# Patient Record
Sex: MALE | Race: WHITE | NOT HISPANIC OR LATINO
[De-identification: names, ages, dates, MRNs, and addresses within clinical notes are randomized per-mention and may not be internally consistent; named-entity substitution may affect disease eponyms.]

---

## 2021-08-11 ENCOUNTER — APPOINTMENT (OUTPATIENT)
Dept: SURGERY | Facility: CLINIC | Age: 76
End: 2021-08-11

## 2021-08-18 ENCOUNTER — APPOINTMENT (OUTPATIENT)
Dept: SURGERY | Facility: CLINIC | Age: 76
End: 2021-08-18

## 2021-08-20 ENCOUNTER — TRANSCRIPTION ENCOUNTER (OUTPATIENT)
Age: 76
End: 2021-08-20

## 2021-08-25 ENCOUNTER — APPOINTMENT (OUTPATIENT)
Dept: SURGERY | Facility: CLINIC | Age: 76
End: 2021-08-25

## 2021-09-02 ENCOUNTER — APPOINTMENT (OUTPATIENT)
Dept: SURGERY | Facility: CLINIC | Age: 76
End: 2021-09-02

## 2021-09-09 ENCOUNTER — APPOINTMENT (OUTPATIENT)
Dept: SURGERY | Facility: CLINIC | Age: 76
End: 2021-09-09

## 2021-09-30 ENCOUNTER — APPOINTMENT (OUTPATIENT)
Dept: SURGERY | Facility: CLINIC | Age: 76
End: 2021-09-30

## 2021-10-07 ENCOUNTER — APPOINTMENT (OUTPATIENT)
Dept: SURGERY | Facility: CLINIC | Age: 76
End: 2021-10-07

## 2021-10-14 ENCOUNTER — APPOINTMENT (OUTPATIENT)
Dept: SURGERY | Facility: CLINIC | Age: 76
End: 2021-10-14

## 2021-10-21 ENCOUNTER — APPOINTMENT (OUTPATIENT)
Dept: SURGERY | Facility: CLINIC | Age: 76
End: 2021-10-21

## 2021-10-28 ENCOUNTER — APPOINTMENT (OUTPATIENT)
Dept: SURGERY | Facility: CLINIC | Age: 76
End: 2021-10-28

## 2021-11-04 ENCOUNTER — APPOINTMENT (OUTPATIENT)
Dept: SURGERY | Facility: CLINIC | Age: 76
End: 2021-11-04

## 2022-03-29 PROBLEM — Z00.00 ENCOUNTER FOR PREVENTIVE HEALTH EXAMINATION: Status: ACTIVE | Noted: 2022-03-29

## 2022-03-31 ENCOUNTER — APPOINTMENT (OUTPATIENT)
Dept: NEPHROLOGY | Facility: CLINIC | Age: 77
End: 2022-03-31
Payer: COMMERCIAL

## 2022-03-31 VITALS
SYSTOLIC BLOOD PRESSURE: 144 MMHG | WEIGHT: 217 LBS | OXYGEN SATURATION: 98 % | DIASTOLIC BLOOD PRESSURE: 78 MMHG | HEART RATE: 78 BPM | RESPIRATION RATE: 18 BRPM | TEMPERATURE: 97.6 F | HEIGHT: 71 IN | BODY MASS INDEX: 30.38 KG/M2

## 2022-03-31 DIAGNOSIS — D64.9 ANEMIA, UNSPECIFIED: ICD-10-CM

## 2022-03-31 DIAGNOSIS — M19.90 UNSPECIFIED OSTEOARTHRITIS, UNSPECIFIED SITE: ICD-10-CM

## 2022-03-31 DIAGNOSIS — K22.70 BARRETT'S ESOPHAGUS W/OUT DYSPLASIA: ICD-10-CM

## 2022-03-31 DIAGNOSIS — Z82.49 FAMILY HISTORY OF ISCHEMIC HEART DISEASE AND OTHER DISEASES OF THE CIRCULATORY SYSTEM: ICD-10-CM

## 2022-03-31 DIAGNOSIS — K51.90 ULCERATIVE COLITIS, UNSPECIFIED, W/OUT COMPLICATIONS: ICD-10-CM

## 2022-03-31 DIAGNOSIS — I71.2 THORACIC AORTIC ANEURYSM, W/OUT RUPTURE: ICD-10-CM

## 2022-03-31 DIAGNOSIS — M10.9 GOUT, UNSPECIFIED: ICD-10-CM

## 2022-03-31 DIAGNOSIS — Z81.2 FAMILY HISTORY OF TOBACCO ABUSE AND DEPENDENCE: ICD-10-CM

## 2022-03-31 DIAGNOSIS — Z82.69 FAMILY HISTORY OF OTHER DISEASES OF THE MUSCULOSKELETAL SYSTEM AND CONNECTIVE TISSUE: ICD-10-CM

## 2022-03-31 DIAGNOSIS — Z87.891 PERSONAL HISTORY OF NICOTINE DEPENDENCE: ICD-10-CM

## 2022-03-31 DIAGNOSIS — K21.9 GASTRO-ESOPHAGEAL REFLUX DISEASE W/OUT ESOPHAGITIS: ICD-10-CM

## 2022-03-31 DIAGNOSIS — E78.5 HYPERLIPIDEMIA, UNSPECIFIED: ICD-10-CM

## 2022-03-31 DIAGNOSIS — I77.9 DISORDER OF ARTERIES AND ARTERIOLES, UNSPECIFIED: ICD-10-CM

## 2022-03-31 PROCEDURE — 99204 OFFICE O/P NEW MOD 45 MIN: CPT

## 2022-03-31 RX ORDER — MULTIVITAMIN
TABLET ORAL DAILY
Refills: 0 | Status: ACTIVE | COMMUNITY

## 2022-03-31 RX ORDER — MELATONIN 3 MG
25 MCG TABLET ORAL
Refills: 0 | Status: ACTIVE | COMMUNITY

## 2022-03-31 RX ORDER — CHLORTHALIDONE 25 MG/1
25 TABLET ORAL DAILY
Refills: 0 | Status: ACTIVE | COMMUNITY

## 2022-03-31 RX ORDER — HYDROXYCHLOROQUINE SULFATE 200 MG/1
200 TABLET, FILM COATED ORAL
Refills: 0 | Status: ACTIVE | COMMUNITY

## 2022-03-31 RX ORDER — FLUTICASONE PROPIONATE 50 MCG
50 SPRAY, SUSPENSION NASAL
Refills: 0 | Status: ACTIVE | COMMUNITY

## 2022-03-31 RX ORDER — MESALAMINE 1.2 G/1
1.2 TABLET, DELAYED RELEASE ORAL DAILY
Refills: 0 | Status: ACTIVE | COMMUNITY

## 2022-03-31 RX ORDER — METOPROLOL SUCCINATE 50 MG/1
50 TABLET, EXTENDED RELEASE ORAL DAILY
Refills: 0 | Status: ACTIVE | COMMUNITY

## 2022-03-31 RX ORDER — ALLOPURINOL 100 MG/1
100 TABLET ORAL
Refills: 0 | Status: ACTIVE | COMMUNITY

## 2022-03-31 RX ORDER — SILDENAFIL 100 MG/1
100 TABLET, FILM COATED ORAL
Refills: 0 | Status: ACTIVE | COMMUNITY

## 2022-03-31 RX ORDER — GLUCOSAMINE/CHONDR SU A SOD 750-600 MG
750-600 TABLET ORAL
Refills: 0 | Status: ACTIVE | COMMUNITY

## 2022-03-31 NOTE — CONSULT LETTER
[Dear  ___] : Dear  [unfilled], [Consult Letter:] : I had the pleasure of evaluating your patient, [unfilled]. [Please see my note below.] : Please see my note below. [Consult Closing:] : Thank you very much for allowing me to participate in the care of this patient.  If you have any questions, please do not hesitate to contact me. [Sincerely,] : Sincerely, [FreeTextEntry3] : Deangelo [DrJesse  ___] : Dr. CAMARA [DrJesse ___] : Dr. CAMARA

## 2022-03-31 NOTE — REVIEW OF SYSTEMS
[As Noted in HPI] : as noted in HPI [Easy Bruising] : a tendency for easy bruising [Negative] : Endocrine [FreeTextEntry8] : nocturia x 1-2, most of the time it is twice, good urinary stream [de-identified] : some balancing issues

## 2022-03-31 NOTE — HISTORY OF PRESENT ILLNESS
[FreeTextEntry1] : Rei Lua is a 76-year old gentleman with a past medical history significant for hypertension, hyperlipidemia, ulcerative colitis on no prior immunosuppressive and without any surgeries, gout, a stable thoracic aortic aneurysm, and bilateral carotid disease in addition to the history outlined below who was followed by Dr. Barker for his stage III chronic kidney disease up until this past summer when Dr. Solano left the area.  He has been doing well and is without any recent illnesses.  He had an episode of diverticulitis in May 2022 though this has not recurred.  He takes no oral NSAIDS. He uses Aspercreme on rare occasions.  His full ROS is below.

## 2022-03-31 NOTE — PHYSICAL EXAM
[General Appearance - Alert] : alert [General Appearance - In No Acute Distress] : in no acute distress [Sclera] : the sclera and conjunctiva were normal [Extraocular Movements] : extraocular movements were intact [Neck Appearance] : the appearance of the neck was normal [] : no respiratory distress [Respiration, Rhythm And Depth] : normal respiratory rhythm and effort [Exaggerated Use Of Accessory Muscles For Inspiration] : no accessory muscle use [Auscultation Breath Sounds / Voice Sounds] : lungs were clear to auscultation bilaterally [Heart Rate And Rhythm] : heart rate was normal and rhythm regular [Heart Sounds] : normal S1 and S2 [Heart Sounds Gallop] : no gallops [Murmurs] : no murmurs [Heart Sounds Pericardial Friction Rub] : no pericardial rub [Edema] : there was no peripheral edema [Bowel Sounds] : normal bowel sounds [Abdomen Soft] : soft [No CVA Tenderness] : no ~M costovertebral angle tenderness [No Spinal Tenderness] : no spinal tenderness [Abnormal Walk] : normal gait [Nail Clubbing] : no clubbing  or cyanosis of the fingernails [Skin Color & Pigmentation] : normal skin color and pigmentation [Involuntary Movements] : no involuntary movements were seen [Skin Turgor] : normal skin turgor [No Focal Deficits] : no focal deficits [Oriented To Time, Place, And Person] : oriented to person, place, and time [Impaired Insight] : insight and judgment were intact [Affect] : the affect was normal [Mood] : the mood was normal

## 2022-03-31 NOTE — ASSESSMENT
[FreeTextEntry1] : Rei Lua is a 76-year old gentleman with a past medical history significant for hypertension, hyperlipidemia, ulcerative colitis on no prior immunosuppressive and without any surgeries, gout, a stable thoracic aortic aneurysm, and bilateral carotid disease in addition to stage III CKD.\par \par Mr. Lua's BUN/creatinine levels have trended as follows:  48/1.6 (1027/2021), 37/1.7 (08/17/2021). \par \par The urine microalbumin to creatinine ratio has trended as follows:  140.4 mcg/mg (01/27/2021), 430.9 mcg/mg (08/18/2021).  The urinalysis on this last date contained 12 WBC per hpf and  2 RBC per hpf.\par \par IMPRESSION:\par \par (1) Stage III CKD.  There is less than 1/2 gram of microalbumin per gram of creatinine (and likely less than 1 gram of proteinuria per gram of creaitnine).  The CKD is stable and likely related to atherosclerosis or hypertensive renal disease.  The chlorthalidone and trandolapril are likely contributors to the elevated serum creatinine. \par \par (2) Proteinuria.  This has increased over the past year.\par \par (3) Hypertension.  Mildly elevated during the first visit to my office.\par \par (4) Gout.  No recent exacerbations\par \par (5) Anemia\par \par RECOMMENDATIONS:\par \par (1) Repeat a set of renal function tests and urine protein studies (see below)\par (2) I made no changes to the medication regimen\par (3) I will obtain the prior renal ultrasound from [facility].   If one has not been done, will obtain one.

## 2022-08-01 ENCOUNTER — APPOINTMENT (OUTPATIENT)
Dept: NEPHROLOGY | Facility: CLINIC | Age: 77
End: 2022-08-01

## 2022-09-29 ENCOUNTER — APPOINTMENT (OUTPATIENT)
Dept: NEPHROLOGY | Facility: CLINIC | Age: 77
End: 2022-09-29

## 2022-09-29 VITALS
DIASTOLIC BLOOD PRESSURE: 82 MMHG | BODY MASS INDEX: 30.8 KG/M2 | TEMPERATURE: 97.8 F | SYSTOLIC BLOOD PRESSURE: 146 MMHG | RESPIRATION RATE: 18 BRPM | WEIGHT: 220 LBS | HEIGHT: 71 IN | HEART RATE: 64 BPM | OXYGEN SATURATION: 95 %

## 2022-09-29 DIAGNOSIS — Z87.19 PERSONAL HISTORY OF OTHER DISEASES OF THE DIGESTIVE SYSTEM: ICD-10-CM

## 2022-09-29 PROCEDURE — 99214 OFFICE O/P EST MOD 30 MIN: CPT

## 2022-09-29 RX ORDER — ASPIRIN 81 MG
81 TABLET, DELAYED RELEASE (ENTERIC COATED) ORAL DAILY
Refills: 0 | Status: DISCONTINUED | COMMUNITY
End: 2022-09-29

## 2022-09-29 RX ORDER — METHYLPREDNISOLONE 4 MG/1
TABLET ORAL
Refills: 0 | Status: ACTIVE | COMMUNITY

## 2022-09-29 NOTE — ASSESSMENT
[FreeTextEntry1] : Rei Lua is a 76-year old gentleman with a past medical history significant for hypertension, hyperlipidemia, ulcerative colitis on no prior immunosuppressive and without any surgeries, gout, a stable thoracic aortic aneurysm, and bilateral carotid disease in addition to stage III CKD.\par \par Mr. Lua's BUN/creatinine levels have trended as follows:  48/1.6 (1027/2021), 37/1.7 (08/17/2021, \par \par The urine microalbumin to creatinine ratio has trended as follows:  140.4 mcg/mg (01/27/2021), 430.9 mcg/mg (08/18/2021), 486.9 mcg/mg (04/13/2022).   Urine protein/creatinine ratio 0.74 grams per gram.\par \par IMPRESSION:\par \par (1) Stage III CKD.  There is less than 1 gram of proteinuria per gram of creatinine.  The CKD was stable in the past.  The CKD is  likely related to atherosclerosis or hypertensive renal disease.  The chlorthalidone and trandolapril are likely contributors to the elevated serum creatinine. \par \par (2) Proteinuria.  \par \par (3) Hypertension.  Mildly elevated during the first visit to my office.  The blood pressure at home has been "a little bit high lately" around 140/70-80. \par \par (4) Gout.  No recent exacerbations\par \par (5) Anemia\par \par RECOMMENDATIONS:\par \par (1) Restart a regular exercise program\par (2) Get salt out of the diet\par (3) Stay well hydrated\par (4) Mr. Lua will follow his blood pressures at home and forward them to me in 1 month.\par (5) I ordered a full set of repeat lab studies (see below)\par (6) We will call for the prior renal ultrasound. \par

## 2022-09-29 NOTE — PHYSICAL EXAM
[General Appearance - Alert] : alert [General Appearance - In No Acute Distress] : in no acute distress [Sclera] : the sclera and conjunctiva were normal [Extraocular Movements] : extraocular movements were intact [Neck Appearance] : the appearance of the neck was normal [] : no respiratory distress [Respiration, Rhythm And Depth] : normal respiratory rhythm and effort [Exaggerated Use Of Accessory Muscles For Inspiration] : no accessory muscle use [Auscultation Breath Sounds / Voice Sounds] : lungs were clear to auscultation bilaterally [Heart Rate And Rhythm] : heart rate was normal and rhythm regular [Heart Sounds] : normal S1 and S2 [Heart Sounds Gallop] : no gallops [Murmurs] : no murmurs [Heart Sounds Pericardial Friction Rub] : no pericardial rub [FreeTextEntry1] : Bilateral LE small pitting edema [Bowel Sounds] : normal bowel sounds [Abdomen Soft] : soft [Abnormal Walk] : normal gait [Involuntary Movements] : no involuntary movements were seen [Skin Color & Pigmentation] : normal skin color and pigmentation [Skin Turgor] : normal skin turgor [No Focal Deficits] : no focal deficits [Oriented To Time, Place, And Person] : oriented to person, place, and time [Impaired Insight] : insight and judgment were intact [Affect] : the affect was normal [Mood] : the mood was normal

## 2022-09-29 NOTE — HISTORY OF PRESENT ILLNESS
[FreeTextEntry1] : Rei Lua is a 76-year old gentleman with a past medical history significant for hypertension, hyperlipidemia, ulcerative colitis on no prior immunosuppressive and without any surgeries, gout, a stable thoracic aortic aneurysm, and bilateral carotid disease in addition to the history outlined below who was followed by Dr. Mj Solano for his stage III chronic kidney disease up until Dr. Solano left the area.    He takes no oral NSAIDS. He uses Aspercreme on rare occasions. \par \par Mr. Lua is here for follow up.  He has some balance issue and has been undergoing physical therapy.  He is unsure if this has been helpful. Overall, he has been doing well.

## 2023-01-24 ENCOUNTER — APPOINTMENT (OUTPATIENT)
Dept: SURGERY | Facility: CLINIC | Age: 78
End: 2023-01-24

## 2023-01-31 ENCOUNTER — APPOINTMENT (OUTPATIENT)
Dept: SURGERY | Facility: CLINIC | Age: 78
End: 2023-01-31

## 2023-02-02 ENCOUNTER — APPOINTMENT (OUTPATIENT)
Dept: NEPHROLOGY | Facility: CLINIC | Age: 78
End: 2023-02-02
Payer: COMMERCIAL

## 2023-02-02 ENCOUNTER — APPOINTMENT (OUTPATIENT)
Dept: NEPHROLOGY | Facility: CLINIC | Age: 78
End: 2023-02-02

## 2023-02-02 VITALS
HEART RATE: 62 BPM | SYSTOLIC BLOOD PRESSURE: 142 MMHG | DIASTOLIC BLOOD PRESSURE: 80 MMHG | OXYGEN SATURATION: 97 % | TEMPERATURE: 97.7 F | BODY MASS INDEX: 30.52 KG/M2 | RESPIRATION RATE: 18 BRPM | HEIGHT: 71 IN | WEIGHT: 218 LBS

## 2023-02-02 PROCEDURE — 99214 OFFICE O/P EST MOD 30 MIN: CPT

## 2023-02-02 RX ORDER — ROSUVASTATIN CALCIUM 10 MG/1
10 TABLET, FILM COATED ORAL DAILY
Refills: 0 | Status: DISCONTINUED | COMMUNITY
End: 2023-02-02

## 2023-02-02 RX ORDER — GABAPENTIN 300 MG/1
300 CAPSULE ORAL 3 TIMES DAILY
Refills: 0 | Status: ACTIVE | COMMUNITY

## 2023-02-02 NOTE — PHYSICAL EXAM
[General Appearance - Alert] : alert [General Appearance - In No Acute Distress] : in no acute distress [Extraocular Movements] : extraocular movements were intact [Sclera] : the sclera and conjunctiva were normal [Neck Appearance] : the appearance of the neck was normal [] : no respiratory distress [Respiration, Rhythm And Depth] : normal respiratory rhythm and effort [Exaggerated Use Of Accessory Muscles For Inspiration] : no accessory muscle use [Heart Rate And Rhythm] : heart rate was normal and rhythm regular [Auscultation Breath Sounds / Voice Sounds] : lungs were clear to auscultation bilaterally [Heart Sounds] : normal S1 and S2 [Heart Sounds Gallop] : no gallops [Murmurs] : no murmurs [Heart Sounds Pericardial Friction Rub] : no pericardial rub [FreeTextEntry1] : Bilateral LE small pitting edema [Bowel Sounds] : normal bowel sounds [Abdomen Soft] : soft [Abnormal Walk] : normal gait [Involuntary Movements] : no involuntary movements were seen [Skin Color & Pigmentation] : normal skin color and pigmentation [Skin Turgor] : normal skin turgor [No Focal Deficits] : no focal deficits [Oriented To Time, Place, And Person] : oriented to person, place, and time [Impaired Insight] : insight and judgment were intact [Affect] : the affect was normal [Mood] : the mood was normal

## 2023-02-02 NOTE — HISTORY OF PRESENT ILLNESS
[FreeTextEntry1] : Rei Lua is a 77-year old gentleman with a past medical history significant for hypertension, hyperlipidemia, ulcerative colitis on no prior immunosuppressive and without any surgeries, gout, a stable thoracic aortic aneurysm, and bilateral carotid disease in addition to the history outlined below who was followed by Dr. Mj Solano for his stage III chronic kidney disease up until Dr. Solano left the area.   He takes no oral NSAIDS. He uses Aspercreme on rare occasions. \par \par Mr. Lua is here for follow up.  He reports having an enzyme in his blood which has something to do with his muscles.  He was told to stop taking his statin.  He denies having any pain other than issues with "a deformed foot". He continues to undergo balance therapy, commenting "it seems to work".

## 2023-02-02 NOTE — ASSESSMENT
[FreeTextEntry1] : Rei Lua is a 77-year old gentleman with a past medical history significant for hypertension, hyperlipidemia, ulcerative colitis on no prior immunosuppressive and without any surgeries, gout, a stable thoracic aortic aneurysm, and bilateral carotid disease in addition to stage III CKD.\par \par Mr. Lua's BUN/creatinine levels have trended as follows:  48/1.6 (1027/2021), 37/1.7 (08/17/2021), 44/1.7 (12/09/2022). The urine protein to creatinine is 882 mg per gram.  The urinalysis on this late date demonstrated 5 WBC per high power field and 100 mg/dL or albumin.   \par \par The urine microalbumin to creatinine ratio has trended as follows:  140.4 mcg/mg (01/27/2021), 430.9 mcg/mg (08/18/2021), 486.9 mcg/mg (04/13/2022).   Urine protein/creatinine ratio 0.88 grams per gram.\par \par RENAL ULTRASOUND (11/30/2021) R. 10.9 cm L. 11.1 cm. Stable simple cyst measuring 1.3 x 1.3 cm in midpole of left kidney.\par \par IMPRESSION:\par \par (1) Stage III CKD.  There is less than 1 gram of proteinuria per gram of creatinine.  The CKD remains stable.  The CKD is  likely related to atherosclerosis or hypertensive renal disease.  The chlorthalidone and trandolapril are likely contributors to the elevated serum creatinine. \par \par (2)  Hypertension.  Mildly elevated today.  Mr. Lua reports his recent blood pressure was 120/70 at his neurologist's office.  \par \par (4) Gout.  Taking allopurinol. No recent exacerbations\par \par (5) Anemia  The HbA1c is 11.2 g/dL.  The MCV is elevated.\par \par (6) Elevated AST and ALT.  \par \par RECOMMENDATIONS:\par \par (1) Dr. Davis, ideally I would consider increasing the trandolapril by a small amount in order to control the proteinuria.  You would need to follow Mr. Lau's serum creatinine and blood pressures closely. \par (2)  We talked about ways to protect the kidneys:\par  -Good blood pressure control\par -Avoid the use of NSAIDS (ibuprofen, Motrin, Aleve,Celebrex, etc.).  Okay to use Tylenol.\par -Avoid salt 'like the plague'.  Limit sodium intake.  Do not add salt to your food.\par -Avoid animal high protein diets.\par -Stay well hydrated.\par -Good cholesterol control.\par (3) Mr. Lua should follow up with me in 1 year with the following lab results: CMP, CBC, phosphorous, urinalysis, random urine albumin, total protein, and creatinine. \par \par We talked about stage III CKD, proteinuria, and ways to protect the renal function. \par \par

## 2023-02-02 NOTE — REVIEW OF SYSTEMS
[As Noted in HPI] : as noted in HPI [Negative] : Heme/Lymph [FreeTextEntry8] : nocturia x 1-3,  good urinary stream (toe pain wakes him up) [de-identified] : itching on left side of face and inside of left knee has resolved [de-identified] : some balancing issues, numbness in feet (affects balance)- saw neurologist, treated with gabapentin

## 2023-03-07 ENCOUNTER — TRANSCRIPTION ENCOUNTER (OUTPATIENT)
Age: 78
End: 2023-03-07

## 2023-03-28 ENCOUNTER — APPOINTMENT (OUTPATIENT)
Dept: NEPHROLOGY | Facility: CLINIC | Age: 78
End: 2023-03-28

## 2023-04-04 ENCOUNTER — TRANSCRIPTION ENCOUNTER (OUTPATIENT)
Age: 78
End: 2023-04-04

## 2023-05-03 ENCOUNTER — NON-APPOINTMENT (OUTPATIENT)
Age: 78
End: 2023-05-03

## 2023-07-06 ENCOUNTER — TRANSCRIPTION ENCOUNTER (OUTPATIENT)
Age: 78
End: 2023-07-06

## 2024-02-05 ENCOUNTER — APPOINTMENT (OUTPATIENT)
Dept: NEPHROLOGY | Facility: CLINIC | Age: 79
End: 2024-02-05
Payer: MEDICARE

## 2024-02-05 VITALS
HEIGHT: 71 IN | OXYGEN SATURATION: 96 % | TEMPERATURE: 97.3 F | RESPIRATION RATE: 16 BRPM | WEIGHT: 214.5 LBS | SYSTOLIC BLOOD PRESSURE: 142 MMHG | HEART RATE: 72 BPM | DIASTOLIC BLOOD PRESSURE: 78 MMHG | BODY MASS INDEX: 30.03 KG/M2

## 2024-02-05 DIAGNOSIS — R79.89 OTHER SPECIFIED ABNORMAL FINDINGS OF BLOOD CHEMISTRY: ICD-10-CM

## 2024-02-05 PROCEDURE — 99214 OFFICE O/P EST MOD 30 MIN: CPT

## 2024-02-05 RX ORDER — EZETIMIBE 10 MG/1
10 TABLET ORAL DAILY
Refills: 0 | Status: ACTIVE | COMMUNITY

## 2024-02-05 RX ORDER — OMEPRAZOLE 20 MG/1
20 CAPSULE, DELAYED RELEASE ORAL DAILY
Refills: 0 | Status: ACTIVE | COMMUNITY

## 2024-02-05 RX ORDER — VERAPAMIL HYDROCHLORIDE 180 MG/1
180 TABLET ORAL DAILY
Refills: 0 | Status: ACTIVE | COMMUNITY

## 2024-02-05 RX ORDER — PANTOPRAZOLE 40 MG/1
40 TABLET, DELAYED RELEASE ORAL DAILY
Refills: 0 | Status: DISCONTINUED | COMMUNITY
End: 2024-02-05

## 2024-02-05 RX ORDER — TRANDOLAPRIL 1 MG/1
1 TABLET ORAL
Refills: 0 | Status: DISCONTINUED | COMMUNITY
End: 2024-02-05

## 2024-02-05 RX ORDER — HYDRALAZINE HYDROCHLORIDE 25 MG/1
25 TABLET ORAL 3 TIMES DAILY
Refills: 0 | Status: ACTIVE | COMMUNITY

## 2024-02-05 RX ORDER — SPIRONOLACTONE 25 MG/1
25 TABLET ORAL DAILY
Qty: 90 | Refills: 1 | Status: ACTIVE | COMMUNITY

## 2024-02-05 NOTE — PHYSICAL EXAM
[General Appearance - Alert] : alert [General Appearance - In No Acute Distress] : in no acute distress [Sclera] : the sclera and conjunctiva were normal [Extraocular Movements] : extraocular movements were intact [Neck Appearance] : the appearance of the neck was normal [] : no respiratory distress [Respiration, Rhythm And Depth] : normal respiratory rhythm and effort [Exaggerated Use Of Accessory Muscles For Inspiration] : no accessory muscle use [Auscultation Breath Sounds / Voice Sounds] : lungs were clear to auscultation bilaterally [Heart Rate And Rhythm] : heart rate was normal and rhythm regular [Heart Sounds] : normal S1 and S2 [Heart Sounds Gallop] : no gallops [Murmurs] : no murmurs [Heart Sounds Pericardial Friction Rub] : no pericardial rub [Edema] : there was no peripheral edema [Bowel Sounds] : normal bowel sounds [Abdomen Soft] : soft [Abnormal Walk] : normal gait [Involuntary Movements] : no involuntary movements were seen [Skin Color & Pigmentation] : normal skin color and pigmentation [Skin Turgor] : normal skin turgor [No Focal Deficits] : no focal deficits [Oriented To Time, Place, And Person] : oriented to person, place, and time [Impaired Insight] : insight and judgment were intact [Affect] : the affect was normal [Mood] : the mood was normal

## 2024-02-05 NOTE — REVIEW OF SYSTEMS
[As Noted in HPI] : as noted in HPI [Easy Bruising] : a tendency for easy bruising [Negative] : Heme/Lymph [FreeTextEntry8] : nocturia x 1, good urinary stream [de-identified] : some balancing issues, numbness in feet (affects balance)- saw neurologist, treated with gabapentin

## 2024-02-05 NOTE — ASSESSMENT
[FreeTextEntry1] : Rei Lua is a 78-year old gentleman with a past medical history significant for hypertension, hyperlipidemia, ulcerative colitis on no prior immunosuppressive and without any surgeries, gout, a stable thoracic aortic aneurysm, and bilateral carotid disease in addition to stage III CKD.  Mr. Lua's BUN/creatinine levels have trended as follows: 48/1.6 (1027/2021), 37/1.7 (08/17/2021), 44/1.7 (12/09/2022) --> 55/2.14 (01/04/2024). The urine protein to creatinine increased from 0.882 g/g to 1.83 g/g over the past year.  The UACR is 1280 mg per gram (01/04/2024).  Remember, this value trended as follows:  The urine microalbumin to creatinine ratio has trended as follows: 140.4 mcg/mg (01/27/2021), 430.9 mcg/mg (08/18/2021), 486.9 mcg/mg (04/13/2022).   RENAL ULTRASOUND (11/30/2021) R. 10.9 cm L. 11.1 cm. Stable simple cyst measuring 1.3 x 1.3 cm in midpole of left kidney.  RECENT LAB STUDIES (01/04/2024):  (6) Anti-histone antibody (+).  Anti DS DNA antibody (-), C3 complement 11 mg/dL, C4 complement 28 mg/dL  IMPRESSION:  (1) Stage III CKD. The proteinuria has progress from 0.8 g/g to 1.8 g/g.  The serum creatinine has increased over the past year.  Of note, Mr. Lua has been taking Chlorthalidone for well over a year, though trandolapril (which can increase serum creatinine) was stopped around May 2023 due to tongue swelling and dyspnea. Spironolactone was added to the medication regimen in the fall.  This can explain the increase in the serum creatinine but does not explain the increase in proteinuria. In the past I commented that the stable CKD was likely related to atherosclerosis or hypertensive renal disease.  Mr. Lua takes no NSAIDS.   (2) Hypertension. Mildly elevated today.   (4) Gout. Taking allopurinol. No recent exacerbations  (5) Anemia The HbA1c is 11.2 g/dL. The MCV is elevated.   RECOMMENDATIONS:  (1) Stay well hydrated. (2) Renal ultrasound. (3) Repeat urine protein studies and a full nephrotic and nephritic workup. (4) Avoid the use of all NSAIDS. (5) Rule out underlying malignancy.  I will call Mr. Lua with the results and a plan.  I gave a copy of this note to Mr. Lua to give to his new Primary Care Physician.      We talked about stage III CKD, proteinuria, and ways to protect the renal function.

## 2024-02-05 NOTE — HISTORY OF PRESENT ILLNESS
[FreeTextEntry1] : Rei Lua is a 78-year-old gentleman with a past medical history significant for hypertension, hyperlipidemia, ulcerative colitis on no prior immunosuppressive and without any surgeries, gout, a stable thoracic aortic aneurysm (scheduled for echocardiogram), and bilateral carotid disease in addition to the history outlined below who was followed by Dr. Mj Solano for his stage III chronic kidney disease. He takes no oral NSAIDS.  He uses Aspercreme on rare occasions.   Mr. Lua is here for follow up.  Over the past year he had 2 toes amputated (one for osteomyelitis), inguinal hernia repair, and retina repair (01/02/204).  Following repair for the torn retina, he contracted COVID.  Currently, he feels "good".  He is going to physical therapy for balance issues related, in part, to his peripheral neuropathy.

## 2024-02-05 NOTE — CONSULT LETTER
[Consult Letter:] : I had the pleasure of evaluating your patient, [unfilled]. [Please see my note below.] : Please see my note below. [Consult Closing:] : Thank you very much for allowing me to participate in the care of this patient.  If you have any questions, please do not hesitate to contact me. [Sincerely,] : Sincerely, [Dear  ___] : Dear  [unfilled], [FreeTextEntry3] : Deangelo [DrJesse  ___] : Dr. CAMARA [DrJesse ___] : Dr. CAMARA

## 2024-02-06 ENCOUNTER — LABORATORY RESULT (OUTPATIENT)
Age: 79
End: 2024-02-06

## 2024-02-07 ENCOUNTER — RESULT REVIEW (OUTPATIENT)
Age: 79
End: 2024-02-07

## 2024-02-23 ENCOUNTER — LABORATORY RESULT (OUTPATIENT)
Age: 79
End: 2024-02-23

## 2024-03-01 LAB
ALBUMIN MFR SERPL ELPH: 55.7 %
ALBUMIN SERPL ELPH-MCNC: 4.2 G/DL
ALBUMIN SERPL-MCNC: 3.6 G/DL
ALBUMIN/GLOB SERPL: 1.2 RATIO
ALP BLD-CCNC: 95 U/L
ALPHA1 GLOB MFR SERPL ELPH: 5.1 %
ALPHA1 GLOB SERPL ELPH-MCNC: 0.3 G/DL
ALPHA2 GLOB MFR SERPL ELPH: 11.2 %
ALPHA2 GLOB SERPL ELPH-MCNC: 0.7 G/DL
ALT SERPL-CCNC: 45 U/L
ANA SER IF-ACNC: NEGATIVE
ANION GAP SERPL CALC-SCNC: 10 MMOL/L
APPEARANCE: CLEAR
ASO AB SER LA-ACNC: 174 IU/ML
AST SERPL-CCNC: 68 U/L
B-GLOBULIN MFR SERPL ELPH: 11.9 %
B-GLOBULIN SERPL ELPH-MCNC: 0.8 G/DL
BACTERIA: NEGATIVE /HPF
BILIRUB SERPL-MCNC: 0.2 MG/DL
BILIRUBIN URINE: NEGATIVE
BLOOD URINE: NEGATIVE
BUN SERPL-MCNC: 56 MG/DL
C3 SERPL-MCNC: 116 MG/DL
C4 SERPL-MCNC: 30 MG/DL
CALCIUM SERPL-MCNC: 8.4 MG/DL
CAST: 0 /LPF
CHLORIDE SERPL-SCNC: 103 MMOL/L
CO2 SERPL-SCNC: 21 MMOL/L
COLOR: YELLOW
CREAT SERPL-MCNC: 2 MG/DL
CREAT SPEC-SCNC: 88 MG/DL
CREAT/PROT UR: 0.7 RATIO
EGFR: 34 ML/MIN/1.73M2
EPITHELIAL CELLS: 0 /HPF
GAMMA GLOB FLD ELPH-MCNC: 1 G/DL
GAMMA GLOB MFR SERPL ELPH: 16.1 %
GLUCOSE QUALITATIVE U: NEGATIVE MG/DL
GLUCOSE SERPL-MCNC: 113 MG/DL
HBV CORE IGG+IGM SER QL: NONREACTIVE
HBV SURFACE AB SER QL: NONREACTIVE
HBV SURFACE AG SER QL: NONREACTIVE
HCV RNA FLD QL NAA+PROBE: NORMAL
HCV RNA SPEC QL PROBE+SIG AMP: NOT DETECTED
IGA 24H UR QL IFE: NORMAL
INTERPRETATION SERPL IEP-IMP: NORMAL
KETONES URINE: NEGATIVE MG/DL
LEUKOCYTE ESTERASE URINE: NEGATIVE
M PROTEIN MFR SERPL ELPH: NORMAL
MICROSCOPIC-UA: NORMAL
MONOCLON BAND OBS SERPL: NORMAL
NITRITE URINE: NEGATIVE
PH URINE: 5
PHOSPHATE SERPL-MCNC: 3.6 MG/DL
PHOSPHOLIPASE A2 RECEPTOR ELISA: <1.8 RU/ML
POTASSIUM SERPL-SCNC: 4.9 MMOL/L
PROT SERPL-MCNC: 6.5 G/DL
PROT UR-MCNC: 58 MG/DL
PROTEIN URINE: 100 MG/DL
RED BLOOD CELLS URINE: 1 /HPF
SODIUM SERPL-SCNC: 134 MMOL/L
SPECIFIC GRAVITY URINE: 1.02
T PALLIDUM AB SER QL IA: NEGATIVE
UROBILINOGEN URINE: 0.2 MG/DL
WHITE BLOOD CELLS URINE: 1 /HPF

## 2024-04-24 ENCOUNTER — TRANSCRIPTION ENCOUNTER (OUTPATIENT)
Age: 79
End: 2024-04-24

## 2024-05-21 ENCOUNTER — APPOINTMENT (OUTPATIENT)
Dept: NEPHROLOGY | Facility: CLINIC | Age: 79
End: 2024-05-21
Payer: MEDICARE

## 2024-05-21 VITALS
RESPIRATION RATE: 14 BRPM | HEART RATE: 60 BPM | HEIGHT: 71 IN | BODY MASS INDEX: 27.58 KG/M2 | WEIGHT: 197 LBS | DIASTOLIC BLOOD PRESSURE: 74 MMHG | SYSTOLIC BLOOD PRESSURE: 122 MMHG

## 2024-05-21 DIAGNOSIS — R80.9 PROTEINURIA, UNSPECIFIED: ICD-10-CM

## 2024-05-21 DIAGNOSIS — I10 ESSENTIAL (PRIMARY) HYPERTENSION: ICD-10-CM

## 2024-05-21 DIAGNOSIS — M60.9 MYOSITIS, UNSPECIFIED: ICD-10-CM

## 2024-05-21 DIAGNOSIS — N18.30 CHRONIC KIDNEY DISEASE, STAGE 3 UNSPECIFIED: ICD-10-CM

## 2024-05-21 DIAGNOSIS — Z87.39 PERSONAL HISTORY OF OTHER DISEASES OF THE MUSCULOSKELETAL SYSTEM AND CONNECTIVE TISSUE: ICD-10-CM

## 2024-05-21 DIAGNOSIS — D47.2 MONOCLONAL GAMMOPATHY: ICD-10-CM

## 2024-05-21 PROCEDURE — 99214 OFFICE O/P EST MOD 30 MIN: CPT

## 2024-05-21 NOTE — REVIEW OF SYSTEMS
[Feeling Tired] : feeling tired [As Noted in HPI] : as noted in HPI [Wheezing] : wheezing [Cough] : cough [Easy Bruising] : a tendency for easy bruising [Negative] : Heme/Lymph [FreeTextEntry7] : having dark stools (taking pepto-bismol).   [FreeTextEntry8] : nocturia x 3-4, good urinary stream [de-identified] : some balancing issues, numbness in feet (affects balance)- saw neurologist, treated with gabapentin

## 2024-05-21 NOTE — CONSULT LETTER
[Consult Letter:] : I had the pleasure of evaluating your patient, [unfilled]. [Please see my note below.] : Please see my note below. [Consult Closing:] : Thank you very much for allowing me to participate in the care of this patient.  If you have any questions, please do not hesitate to contact me. [Sincerely,] : Sincerely, [DrJesse  ___] : Dr. CAMARA [Dear  ___] : Dear  [unfilled], [FreeTextEntry3] : Deangelo [DrJesse ___] : Dr. CAMARA

## 2024-05-21 NOTE — ASSESSMENT
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  He is taking amoxicillin for pneumonia.   Overall, he is feeling much arnold  Mr. Lua's BUN/creatinine levels have trended as follows: 48/1.6 (1027/2021), 37/1.7 (08/17/2021), 44/1.7 (12/09/2022) --> 55/2.14 (01/04/2024), 56/2.0 (2/24/2024), 58/2.11 (04/23/2024), 68/2.30 (04/23/3034), 65/2.03 (04/24/2024). The urine protein to creatinine increased from 0.882 g/g to 1.83 g/g over the past year, though on repeat was 0.7 g/g (052/24/2024).  Remember, this value trended as follows: The urine microalbumin to creatinine ratio has trended as follows: 140.4 mcg/mg (01/27/2021), 430.9 mcg/mg (08/18/2021), 486.9 mcg/mg (04/13/2022).  RENAL ULTRASOUND (11/30/2021) R. 10.9 cm L. 11.1 cm. Stable simple cyst measuring 1.3 x 1.3 cm in midpole of left kidney.   LAB STUDIES (01/04/2024): (Anti-histone antibody (+). Anti DS DNA antibody (-), C3 complement 11 mg/dL, C4 complement 28 mg/dL  PROTEINURIA WORKUP: (-) except for weak gamma-migrating paraprotein (unable to quantitate) and a weak IgG kappa band detected on the serum immunofixation.   IMPRESSION:  (1) Stage III CKD. The proteinuria has progress from 0.8 g/g to 1.8 g/g. The serum creatinine has increased over the past year. Of note, Mr. Lua has been taking Chlorthalidone for well over a year, though trandolapril (which can increase serum creatinine) was stopped around May 2023 due to tongue swelling and dyspnea. Spironolactone was added to the medication regimen in the fall. This can explain the increase in the serum creatinine but does not explain the increase in proteinuria. In the past I commented that the stable CKD was likely related to atherosclerosis or hypertensive renal disease. I can not rule out a contribution from the myositis although this is less likely with CPK levels that generally run under 1000, and per my review were no higher than 2000. Mr. Lua takes no NSAIDS.  (2) Hypertension. Excellent control.  Lost weight and cut back on alcohol.   (4) Gout. Taking allopurinol. No recent exacerbations  (5) Anemia.  Recent GI bleed.   (6) (+) serum immunofixation. Weak IgG kappa band identified.  Can be seen in autoimmune diseases.    RECOMMENDATIONS:  (1) Stay well hydrated. (2) Continue to limit alcohol. (3) Avoid the use of all NSAIDS. (4) Rule out underlying malignancy. (5) Return in 6 months with the lab studies below.

## 2024-05-21 NOTE — PHYSICAL EXAM
[General Appearance - Alert] : alert [General Appearance - In No Acute Distress] : in no acute distress [Sclera] : the sclera and conjunctiva were normal [Extraocular Movements] : extraocular movements were intact [Neck Appearance] : the appearance of the neck was normal [] : no respiratory distress [Respiration, Rhythm And Depth] : normal respiratory rhythm and effort [Exaggerated Use Of Accessory Muscles For Inspiration] : no accessory muscle use [Auscultation Breath Sounds / Voice Sounds] : lungs were clear to auscultation bilaterally [Heart Rate And Rhythm] : heart rate was normal and rhythm regular [Heart Sounds] : normal S1 and S2 [Heart Sounds Gallop] : no gallops [Murmurs] : no murmurs [Heart Sounds Pericardial Friction Rub] : no pericardial rub [Edema] : there was no peripheral edema [Bowel Sounds] : normal bowel sounds [Abnormal Walk] : normal gait [Abdomen Soft] : soft [Involuntary Movements] : no involuntary movements were seen [Skin Color & Pigmentation] : normal skin color and pigmentation [Skin Turgor] : normal skin turgor [No Focal Deficits] : no focal deficits [Oriented To Time, Place, And Person] : oriented to person, place, and time [Impaired Insight] : insight and judgment were intact [Mood] : the mood was normal [Affect] : the affect was normal

## 2024-05-21 NOTE — HISTORY OF PRESENT ILLNESS
[FreeTextEntry1] : Rei Lua is a 78-year-old gentleman with a past medical history significant for hypertension, hyperlipidemia, ulcerative colitis on no prior immunosuppressive and without any surgeries, gout, a stable thoracic aortic aneurysm (scheduled for echocardiogram), and bilateral carotid disease in addition to the history outlined below who was followed by Dr. Mj Solano for his stage III chronic kidney disease. He takes no oral NSAIDS.  He uses Aspercreme on rare occasions.   Mr. Lua is here for follow up.  He had a GI bleed (did not require PRBC transfusion) and developed pneumonia (now treated with amoxicillin) since his visit with me on 02/05/2024.  In addition, he is being treated with steroids for myositis.  He lost 15 pounds.  New medications:  amoxicillin, methylprednisolone.

## 2024-07-24 DIAGNOSIS — M62.82 RHABDOMYOLYSIS: ICD-10-CM

## 2024-07-31 LAB
CK SERPL-CCNC: 394 U/L
CREAT SPEC-SCNC: 102 MG/DL
CREAT SPEC-SCNC: 102 MG/DL
CREAT/PROT UR: 0.8 RATIO
HCT VFR BLD CALC: 34.1 %
HGB BLD-MCNC: 10.6 G/DL
MCHC RBC-ENTMCNC: 31.1 GM/DL
MCHC RBC-ENTMCNC: 32.9 PG
MCV RBC AUTO: 105.9 FL
MICROALBUMIN 24H UR DL<=1MG/L-MCNC: 49.1 MG/DL
MICROALBUMIN/CREAT 24H UR-RTO: 482 MG/G
PLATELET # BLD AUTO: 209 K/UL
PROT UR-MCNC: 77 MG/DL
RBC # BLD: 3.22 M/UL
RBC # FLD: 16.9 %
WBC # FLD AUTO: 5.53 K/UL

## 2024-08-08 ENCOUNTER — NON-APPOINTMENT (OUTPATIENT)
Age: 79
End: 2024-08-08

## 2024-11-13 ENCOUNTER — LABORATORY RESULT (OUTPATIENT)
Age: 79
End: 2024-11-13

## 2024-11-18 ENCOUNTER — APPOINTMENT (OUTPATIENT)
Dept: NEPHROLOGY | Facility: CLINIC | Age: 79
End: 2024-11-18
Payer: MEDICARE

## 2024-11-18 VITALS
SYSTOLIC BLOOD PRESSURE: 132 MMHG | HEIGHT: 71 IN | BODY MASS INDEX: 29.54 KG/M2 | RESPIRATION RATE: 18 BRPM | HEART RATE: 75 BPM | TEMPERATURE: 97.8 F | WEIGHT: 211 LBS | DIASTOLIC BLOOD PRESSURE: 78 MMHG | OXYGEN SATURATION: 98 %

## 2024-11-18 DIAGNOSIS — N18.30 CHRONIC KIDNEY DISEASE, STAGE 3 UNSPECIFIED: ICD-10-CM

## 2024-11-18 DIAGNOSIS — R80.9 PROTEINURIA, UNSPECIFIED: ICD-10-CM

## 2024-11-18 DIAGNOSIS — I10 ESSENTIAL (PRIMARY) HYPERTENSION: ICD-10-CM

## 2024-11-18 PROCEDURE — 99214 OFFICE O/P EST MOD 30 MIN: CPT

## 2024-11-18 RX ORDER — APIXABAN 5 MG/1
5 TABLET, FILM COATED ORAL
Refills: 0 | Status: ACTIVE | COMMUNITY

## 2024-11-18 RX ORDER — HYDRALAZINE HYDROCHLORIDE 50 MG/1
50 TABLET ORAL 3 TIMES DAILY
Refills: 0 | Status: ACTIVE | COMMUNITY

## 2024-11-18 RX ORDER — MYCOPHENOLATE MOFETIL 500 MG/1
500 TABLET ORAL TWICE DAILY
Qty: 360 | Refills: 0 | Status: ACTIVE | COMMUNITY

## 2024-11-18 RX ORDER — PREDNISONE 5 MG/1
5 TABLET ORAL DAILY
Refills: 0 | Status: ACTIVE | COMMUNITY

## 2025-03-20 ENCOUNTER — RESULT REVIEW (OUTPATIENT)
Age: 80
End: 2025-03-20

## 2025-05-09 ENCOUNTER — NON-APPOINTMENT (OUTPATIENT)
Age: 80
End: 2025-05-09

## 2025-05-19 ENCOUNTER — NON-APPOINTMENT (OUTPATIENT)
Age: 80
End: 2025-05-19

## 2025-05-20 ENCOUNTER — APPOINTMENT (OUTPATIENT)
Dept: NEPHROLOGY | Facility: CLINIC | Age: 80
End: 2025-05-20

## 2025-07-01 ENCOUNTER — APPOINTMENT (OUTPATIENT)
Dept: NEPHROLOGY | Facility: CLINIC | Age: 80
End: 2025-07-01
Payer: MEDICARE

## 2025-07-01 VITALS
HEIGHT: 71 IN | WEIGHT: 209 LBS | OXYGEN SATURATION: 96 % | DIASTOLIC BLOOD PRESSURE: 66 MMHG | HEART RATE: 74 BPM | SYSTOLIC BLOOD PRESSURE: 122 MMHG | RESPIRATION RATE: 16 BRPM | BODY MASS INDEX: 29.26 KG/M2 | TEMPERATURE: 98 F

## 2025-07-01 PROBLEM — E87.5 HYPERKALEMIA: Status: ACTIVE | Noted: 2025-07-01

## 2025-07-01 PROCEDURE — 99215 OFFICE O/P EST HI 40 MIN: CPT

## 2025-08-06 ENCOUNTER — LABORATORY RESULT (OUTPATIENT)
Age: 80
End: 2025-08-06

## 2025-08-13 ENCOUNTER — LABORATORY RESULT (OUTPATIENT)
Age: 80
End: 2025-08-13

## 2025-08-15 LAB
BASOPHILS # BLD AUTO: 0.02 K/UL
BASOPHILS NFR BLD AUTO: 0.3 %
EOSINOPHIL # BLD AUTO: 0.09 K/UL
EOSINOPHIL NFR BLD AUTO: 1.5 %
FERRITIN SERPL-MCNC: 61 NG/ML
FOLATE SERPL-MCNC: >20 NG/ML
HCT VFR BLD CALC: 28.6 %
HGB BLD-MCNC: 8.7 G/DL
IMM GRANULOCYTES NFR BLD AUTO: 0.3 %
IRON SATN MFR SERPL: 39 %
IRON SERPL-MCNC: 107 UG/DL
LDH SERPL-CCNC: 346 U/L
LYMPHOCYTES # BLD AUTO: 0.42 K/UL
LYMPHOCYTES NFR BLD AUTO: 7.2 %
MAN DIFF?: NORMAL
MCHC RBC-ENTMCNC: 30 PG
MCHC RBC-ENTMCNC: 30.4 G/DL
MCV RBC AUTO: 98.6 FL
MONOCYTES # BLD AUTO: 0.83 K/UL
MONOCYTES NFR BLD AUTO: 14.2 %
NEUTROPHILS # BLD AUTO: 4.48 K/UL
NEUTROPHILS NFR BLD AUTO: 76.5 %
PLATELET # BLD AUTO: 229 K/UL
RBC # BLD: 2.9 M/UL
RBC # BLD: 2.9 M/UL
RBC # FLD: 14.7 %
RETICS # AUTO: 1.2 %
RETICS AGGREG/RBC NFR: 33.9 K/UL
TIBC SERPL-MCNC: 276 UG/DL
UIBC SERPL-MCNC: 169 UG/DL
VIT B12 SERPL-MCNC: 537 PG/ML
WBC # FLD AUTO: 5.86 K/UL

## 2025-09-05 ENCOUNTER — RESULT REVIEW (OUTPATIENT)
Age: 80
End: 2025-09-05

## 2025-09-05 ENCOUNTER — APPOINTMENT (OUTPATIENT)
Dept: HEMATOLOGY ONCOLOGY | Facility: CLINIC | Age: 80
End: 2025-09-05
Payer: MEDICARE

## 2025-09-05 VITALS
BODY MASS INDEX: 28.84 KG/M2 | HEART RATE: 80 BPM | OXYGEN SATURATION: 98 % | SYSTOLIC BLOOD PRESSURE: 152 MMHG | TEMPERATURE: 97.6 F | HEIGHT: 71 IN | DIASTOLIC BLOOD PRESSURE: 70 MMHG | WEIGHT: 206 LBS | RESPIRATION RATE: 16 BRPM

## 2025-09-05 DIAGNOSIS — D47.2 MONOCLONAL GAMMOPATHY: ICD-10-CM

## 2025-09-05 DIAGNOSIS — D64.9 ANEMIA, UNSPECIFIED: ICD-10-CM

## 2025-09-05 PROCEDURE — 99205 OFFICE O/P NEW HI 60 MIN: CPT

## 2025-09-05 PROCEDURE — G2211 COMPLEX E/M VISIT ADD ON: CPT

## 2025-09-18 DIAGNOSIS — M10.9 GOUT, UNSPECIFIED: ICD-10-CM

## 2025-09-19 ENCOUNTER — LABORATORY RESULT (OUTPATIENT)
Age: 80
End: 2025-09-19

## 2025-09-19 ENCOUNTER — APPOINTMENT (OUTPATIENT)
Dept: HEMATOLOGY ONCOLOGY | Facility: CLINIC | Age: 80
End: 2025-09-19

## 2025-09-19 ENCOUNTER — RESULT REVIEW (OUTPATIENT)
Age: 80
End: 2025-09-19

## 2025-09-19 VITALS
BODY MASS INDEX: 28.56 KG/M2 | HEIGHT: 71 IN | SYSTOLIC BLOOD PRESSURE: 132 MMHG | WEIGHT: 204 LBS | RESPIRATION RATE: 16 BRPM | OXYGEN SATURATION: 98 % | TEMPERATURE: 97.7 F | HEART RATE: 76 BPM | DIASTOLIC BLOOD PRESSURE: 74 MMHG

## 2025-09-19 DIAGNOSIS — D64.9 ANEMIA, UNSPECIFIED: ICD-10-CM

## 2025-09-19 DIAGNOSIS — D47.2 MONOCLONAL GAMMOPATHY: ICD-10-CM

## 2025-09-19 PROCEDURE — 38221 DX BONE MARROW BIOPSIES: CPT | Mod: LT
